# Patient Record
Sex: FEMALE
[De-identification: names, ages, dates, MRNs, and addresses within clinical notes are randomized per-mention and may not be internally consistent; named-entity substitution may affect disease eponyms.]

---

## 2021-08-06 ENCOUNTER — TRANSCRIPTION ENCOUNTER (OUTPATIENT)
Age: 21
End: 2021-08-06

## 2021-09-07 PROBLEM — Z00.00 ENCOUNTER FOR PREVENTIVE HEALTH EXAMINATION: Status: ACTIVE | Noted: 2021-09-07

## 2021-09-21 ENCOUNTER — APPOINTMENT (OUTPATIENT)
Dept: RHEUMATOLOGY | Facility: CLINIC | Age: 21
End: 2021-09-21
Payer: COMMERCIAL

## 2021-09-21 VITALS
BODY MASS INDEX: 27.6 KG/M2 | OXYGEN SATURATION: 96 % | HEIGHT: 62 IN | WEIGHT: 150 LBS | SYSTOLIC BLOOD PRESSURE: 116 MMHG | DIASTOLIC BLOOD PRESSURE: 70 MMHG | RESPIRATION RATE: 18 BRPM | HEART RATE: 87 BPM

## 2021-09-21 DIAGNOSIS — Z80.49 FAMILY HISTORY OF MALIGNANT NEOPLASM OF OTHER GENITAL ORGANS: ICD-10-CM

## 2021-09-21 DIAGNOSIS — Z82.61 FAMILY HISTORY OF ARTHRITIS: ICD-10-CM

## 2021-09-21 DIAGNOSIS — Z83.3 FAMILY HISTORY OF DIABETES MELLITUS: ICD-10-CM

## 2021-09-21 DIAGNOSIS — Z80.0 FAMILY HISTORY OF MALIGNANT NEOPLASM OF DIGESTIVE ORGANS: ICD-10-CM

## 2021-09-21 DIAGNOSIS — Z82.49 FAMILY HISTORY OF ISCHEMIC HEART DISEASE AND OTHER DISEASES OF THE CIRCULATORY SYSTEM: ICD-10-CM

## 2021-09-21 PROCEDURE — 99204 OFFICE O/P NEW MOD 45 MIN: CPT

## 2021-09-21 NOTE — ASSESSMENT
[FreeTextEntry1] : 21 y/o female referred to rheumatology for diagnosis of Von Willebrand's in setting of easy bruising and evalution for autoimmune disease. Pt was referred to rheumatology for evaluation for underlying CTD in setting of joint pains since young age, worst in b/l knees, now spread to ankles, wrists with associated swelling, AM stiffness 2 hours. Also reports photosensitivity of face and chest, chronic fatigue, muscle aches/weakness.\par \par Exam without synovitis, few areas of mild ecchymosis. Not hypermobile. Patient's young age with inflammatory descriptions of joint pains warrants workup for underlying CTD including SLE.\par \par - Obtain labwork for evaluation for underlying CTD, including SLE and APLS\par - Obtain X-rays of b/l knees and ankles\par - RTC in 2 months to discuss results\par

## 2021-09-21 NOTE — HISTORY OF PRESENT ILLNESS
[FreeTextEntry1] : 21 y/o female referred to rheumatology for abnormal bloodwork and concern for lupus.\par \par Pt was diagnosed with Von Willebrand's disease by hematology in setting of easy bruising 2 weeks ago. Pt was referred to rheumatology for evaluation for underlying CTD in setting of joint pains.\par \par Pt has had joint pains of b/l knees since young age, worsening over time. Pt's knee pains much worse last year with  spreading to wrists and ankles. Reports swelling of her knees and ankles. AM stiffnes x 2 hours of entire body. Never had X-ray of these areas. Pt was seen by pediatric rheum at age ~14-15, told it was growing pains.\par \par Reports photosensitive bumps and redness on face and chest with sun, chronic fatigue, muscle pain and weakness generalized worst in wrists.\par \par Labs (5/2021-9/2021):\par vWF activity low\par Normal/neg CBC, CMP, PT, INR, \par \par Patient denies fatigue, fever, chills, nasopharyngeal ulcers, chest pain, abdominal pain, cough, SOB, nausea, vomiting, diarrhea, blood in stool, hematuria, Raynaud's, dry eyes, dry mouth, miscarriages (never been pregnant), Hx of DVT/PEs.\par \par ROS negative unless otherwise noted above.\par \par

## 2021-10-12 ENCOUNTER — APPOINTMENT (OUTPATIENT)
Dept: RHEUMATOLOGY | Facility: CLINIC | Age: 21
End: 2021-10-12

## 2021-11-23 ENCOUNTER — APPOINTMENT (OUTPATIENT)
Dept: RHEUMATOLOGY | Facility: CLINIC | Age: 21
End: 2021-11-23
Payer: COMMERCIAL

## 2021-11-23 VITALS
DIASTOLIC BLOOD PRESSURE: 73 MMHG | BODY MASS INDEX: 27.6 KG/M2 | OXYGEN SATURATION: 96 % | TEMPERATURE: 97.2 F | WEIGHT: 150 LBS | HEIGHT: 62 IN | HEART RATE: 82 BPM | SYSTOLIC BLOOD PRESSURE: 119 MMHG

## 2021-11-23 DIAGNOSIS — R53.83 OTHER FATIGUE: ICD-10-CM

## 2021-11-23 DIAGNOSIS — D68.0 VON WILLEBRAND'S DISEASE: ICD-10-CM

## 2021-11-23 DIAGNOSIS — M25.571 PAIN IN RIGHT ANKLE AND JOINTS OF RIGHT FOOT: ICD-10-CM

## 2021-11-23 DIAGNOSIS — L56.8 OTHER SPECIFIED ACUTE SKIN CHANGES DUE TO ULTRAVIOLET RADIATION: ICD-10-CM

## 2021-11-23 DIAGNOSIS — M25.562 PAIN IN RIGHT KNEE: ICD-10-CM

## 2021-11-23 DIAGNOSIS — M25.561 PAIN IN RIGHT KNEE: ICD-10-CM

## 2021-11-23 DIAGNOSIS — M25.572 PAIN IN RIGHT ANKLE AND JOINTS OF RIGHT FOOT: ICD-10-CM

## 2021-11-23 PROCEDURE — 99213 OFFICE O/P EST LOW 20 MIN: CPT

## 2021-11-23 NOTE — HISTORY OF PRESENT ILLNESS
[FreeTextEntry1] : HISTORY: \par 22 y/o female referred to rheumatology for diagnosis of Von Willebrand's in setting of easy bruising and evaluation for autoimmune disease. Pt was referred to rheumatology for evaluation for underlying CTD in setting of joint pains since young age, worst in b/l knees, now spread to ankles, wrists with associated swelling, AM stiffness 2 hours. Also reports photosensitivity of face and chest, chronic fatigue, muscle aches/weakness. \par \par INTERVAL HISTORY:  \par Pt continues to have pains in the b/l knees and ankles.\par Continues to have some bruising of thighs resolving but denies any worsening or new bruises.\par Denies any other symptoms.\par \par WORKUP: \par Labs remarkable for (11/2021): B12 170 (nml >232), Vit D 25-OH 29.9 \par Normal/neg (11/2021): CMP, U/A, UPCR, ESR, CRP, CPK, dsDNA, SSA, SSB, Smith, RNP, CMP, RF, CCP, C3, C4, Thyroid Ab, Folate, TSH \par XR R knee and b/l ankles (11/2021): Normal\par

## 2021-11-23 NOTE — ASSESSMENT
[FreeTextEntry1] : 22 y/o female referred to rheumatology for diagnosis of Von Willebrand's in setting of easy bruising and evaluation for autoimmune disease. Pt was referred to rheumatology for evaluation for underlying CTD in setting of joint pains since young age, worst in b/l knees, now spread to ankles, wrists with associated swelling, AM stiffness 2 hours. Also reports photosensitivity of face and chest, chronic fatigue, muscle aches/weakness. \par \par Exam without synovitis, few areas of mild ecchymosis. Not hypermobile. Workup with mildly low B12, Vit D. Normal/neg inflammatory markers, DIMA, SSA, DIMA subserologies, RF, CCP, TSH. XR of R knee and b/l ankles normal. Pt does not have any signs of underlying autoimmune inflammatory rheum disease at this time.\par \par - Advised B12 supplementation 1000mcg/day, Vit D supplementation 1000 IU daily\par - Advised conservative management for her pains, which are likely soft tissue related. Discussed with patient  oral/topical analgesics, pain therapies (yoga, edmund chi, acupuncture, chiropractor, massage therapy, wax therapy, etc.), PT/OT. Advised on healthy diet, exercise, smoking cessation, weight loss, stress management, sleep hygiene, control of comorbidities to help with management of pain and improve daily function.\par - RTC PRN\par

## 2023-04-11 ENCOUNTER — NON-APPOINTMENT (OUTPATIENT)
Age: 23
End: 2023-04-11

## 2024-02-26 ENCOUNTER — APPOINTMENT (OUTPATIENT)
Dept: ORTHOPEDIC SURGERY | Facility: CLINIC | Age: 24
End: 2024-02-26
Payer: COMMERCIAL

## 2024-02-26 DIAGNOSIS — S39.012A STRAIN OF MUSCLE, FASCIA AND TENDON OF LOWER BACK, INITIAL ENCOUNTER: ICD-10-CM

## 2024-02-26 PROCEDURE — 72100 X-RAY EXAM L-S SPINE 2/3 VWS: CPT

## 2024-02-26 PROCEDURE — 99203 OFFICE O/P NEW LOW 30 MIN: CPT

## 2024-02-26 RX ORDER — METHYLPREDNISOLONE 4 MG/1
4 TABLET ORAL
Qty: 1 | Refills: 0 | Status: ACTIVE | COMMUNITY
Start: 2024-02-26 | End: 1900-01-01

## 2024-02-27 NOTE — ASSESSMENT
[FreeTextEntry1] : The patient has a likely lumbar spine trigger point formation.   She was prescribed a medrol dose pack for pain. She will ice and rest as needed.  The patient will begin PT. She will follow up with pain management for possible lumbar spine MRI.  She is having no radicular symptoms or radiating symptoms at today's visit.

## 2024-02-27 NOTE — HISTORY OF PRESENT ILLNESS
[de-identified] : The patient is a 22 yo woman presenting with lumbar spine pain. She reports that her pain has increased without trauma to the left side of her spine. There is no swelling or ecchymosis but she has point tenderness to the area. This is worsened with bending and twisting. She has pain reaching to the floor and with ADLs. She has pain at night with pressure or with overuse. The patient has tried muscle relaxants, oral AIs, topical Ais without relief. She has no pain with sneezing, coughing, or deep breaths. She denies any radicular symptoms. She has pain with long periods of standing and sitting. She has a history of RA.

## 2024-02-27 NOTE — PHYSICAL EXAM
[NL (30)] : right lateral bending 30 degrees [Flexion] : flexion [Bending to left] : bending to left [No bony abnormalities] : No bony abnormalities [] : sensory exam non-focal throughout both lower extremities [No spinal deformity, fracture, lytic lesion, or marked single level collapse] : No spinal deformity, fracture, lytic lesion, or marked single level collapse [Straightening consistent with spasm] : Straightening consistent with spasm [FreeTextEntry3] : TTP to left sided paraspinal muscles of the lumbar spine. Worst at L3-L4/L4-L5 [de-identified] : left lateral bending 25 degrees [de-identified] : extension 30 degrees [[id number]] : forward flexion 75 degrees [de-identified] : left lateral rotation 25 degrees

## 2024-12-31 ENCOUNTER — TRANSCRIPTION ENCOUNTER (OUTPATIENT)
Age: 24
End: 2024-12-31

## 2024-12-31 ENCOUNTER — APPOINTMENT (OUTPATIENT)
Dept: ORTHOPEDIC SURGERY | Facility: CLINIC | Age: 24
End: 2024-12-31
Payer: COMMERCIAL

## 2024-12-31 VITALS — WEIGHT: 157 LBS | HEIGHT: 62 IN | BODY MASS INDEX: 28.89 KG/M2

## 2024-12-31 DIAGNOSIS — Z87.39 PERSONAL HISTORY OF OTHER DISEASES OF THE MUSCULOSKELETAL SYSTEM AND CONNECTIVE TISSUE: ICD-10-CM

## 2024-12-31 DIAGNOSIS — M62.838 OTHER MUSCLE SPASM: ICD-10-CM

## 2024-12-31 PROCEDURE — 72040 X-RAY EXAM NECK SPINE 2-3 VW: CPT

## 2024-12-31 PROCEDURE — 72100 X-RAY EXAM L-S SPINE 2/3 VWS: CPT

## 2024-12-31 PROCEDURE — 99204 OFFICE O/P NEW MOD 45 MIN: CPT

## 2024-12-31 RX ORDER — PREGABALIN 100 MG/1
100 CAPSULE ORAL
Refills: 0 | Status: ACTIVE | COMMUNITY

## 2024-12-31 RX ORDER — HYDROXYCHLOROQUINE SULFATE 200 MG/1
200 TABLET ORAL
Refills: 0 | Status: ACTIVE | COMMUNITY

## 2025-02-11 ENCOUNTER — APPOINTMENT (OUTPATIENT)
Dept: ORTHOPEDIC SURGERY | Facility: CLINIC | Age: 25
End: 2025-02-11
Payer: COMMERCIAL

## 2025-02-11 PROCEDURE — 99213 OFFICE O/P EST LOW 20 MIN: CPT

## 2025-02-11 RX ORDER — METHYLPREDNISOLONE 4 MG/1
4 TABLET ORAL
Qty: 1 | Refills: 1 | Status: ACTIVE | COMMUNITY
Start: 2025-02-11 | End: 1900-01-01

## 2025-02-12 ENCOUNTER — APPOINTMENT (OUTPATIENT)
Dept: MRI IMAGING | Facility: CLINIC | Age: 25
End: 2025-02-12
Payer: COMMERCIAL

## 2025-02-12 PROCEDURE — 72148 MRI LUMBAR SPINE W/O DYE: CPT

## 2025-02-12 PROCEDURE — 72141 MRI NECK SPINE W/O DYE: CPT

## 2025-02-18 ENCOUNTER — APPOINTMENT (OUTPATIENT)
Dept: ORTHOPEDIC SURGERY | Facility: CLINIC | Age: 25
End: 2025-02-18
Payer: COMMERCIAL

## 2025-02-18 DIAGNOSIS — S39.012A STRAIN OF MUSCLE, FASCIA AND TENDON OF LOWER BACK, INITIAL ENCOUNTER: ICD-10-CM

## 2025-02-18 DIAGNOSIS — M62.838 OTHER MUSCLE SPASM: ICD-10-CM

## 2025-02-18 PROCEDURE — 99213 OFFICE O/P EST LOW 20 MIN: CPT
